# Patient Record
(demographics unavailable — no encounter records)

---

## 2024-11-07 NOTE — ASSESSMENT
[FreeTextEntry1] : 56 yo female presenting today for gelsyn injections for moderate to severe left knee OA with medial joint space narrowing. Patient had left knee csi in the past with originially great relief of pain and then noted injections became less effective. -Discussed with patient risks, benefits, alternatives of left knee intraarticular visco injection, patient tolerated well -Activities as tolerated -Rest, ice, compression, elevation, NSAIDs PRN for pain.  -All questions answered -F/u 1 week for injection #2  The diagnosis was explained in detail. The potential non-surgical and surgical treatments were reviewed. The relative risks and benefits of each option were considered relative to the patients age, activity level, medical history, symptom severity and previously attempted treatments.  The patient was advised to consult with their primary medical provider prior to initiation of any new medications to reduce the risk of adverse effects specific to their long-term home medications and medical history. The risk of gastrointestinal irritation and kidney injury specific to long-term NSAID use was discussed.   Entered by Cleve Rivera PA-C acting as scribe. Dr. Harper Attestation The documentation recorded by the scribe, in my presence, accurately reflects the service I, Dr. Harper, personally performed, and the decisions made by me with my edits as appropriate.

## 2024-11-07 NOTE — PHYSICAL EXAM
[de-identified] : Examination of the left knee is as follows: INSPECTION: mild effusion, but no ecchymosis, no erythema, no atrophy, no deformities of quad tendon or of patellar tendon PALPATION: medial joint line tenderness, but no palpable mass, no obvious defects, no increased warmth, no calf tenderness ROM: flexion 110 degrees, but extension 0 degrees STRENGTH: quadriceps 5/5, hamstring 5/5 TESTING: ligamentously stable NEURO: light touch is intact throughout GAIT: mildly antalgic, but patient ambulates without assistive device  X-rays of the left knee is as follows: [facility] Knee 3 view in the anteroposterior, lateral, skyline views: moderate tricompartmental OA with medial joint space narrowing

## 2024-11-07 NOTE — HISTORY OF PRESENT ILLNESS
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

## 2024-11-07 NOTE — PROCEDURE
[FreeTextEntry3] : Large joint injection was performed of the left knee. The indication for this procedure was pain, inflammation, x-ray evidence of Osteoarthritis on this or prior visit and repeat series performed. The site was prepped with alcohol and sterile technique used. An injection of Gelsyn, series #1 was used. Patient tolerated procedure well.  Gelsyn-3 is comprised of 3 weekly injections, each injection is comprised of 16.8 mg/2 mL. Patient was advised to call if redness, pain or fever occur and apply ice for 15 minutes out of every hour for the next 12-24 hours as tolerated.Patient has tried OTC's including aspirin, ibuprofen, Aleve, etc. or prescription NSAIDs, and/or exercises at home and/or physical therapy without satisfactory response, patient had decreased mobility in the joint and the risks, benefits, and alternatives have been discussed, and verbal consent was obtained.

## 2024-11-14 NOTE — PROCEDURE
[FreeTextEntry3] : Large joint injection was performed of the left knee. The indication for this procedure was pain, inflammation, x-ray evidence of Osteoarthritis on this or prior visit and repeat series performed. The site was prepped with alcohol and sterile technique used. An injection of Gelsyn, series #2 was used. Patient tolerated procedure well.  Gelsyn-3 is comprised of 3 weekly injections, each injection is comprised of 16.8 mg/2 mL. Patient was advised to call if redness, pain or fever occur and apply ice for 15 minutes out of every hour for the next 12-24 hours as tolerated.Patient has tried OTC's including aspirin, ibuprofen, Aleve, etc. or prescription NSAIDs, and/or exercises at home and/or physical therapy without satisfactory response, patient had decreased mobility in the joint and the risks, benefits, and alternatives have been discussed, and verbal consent was obtained.

## 2024-11-14 NOTE — ASSESSMENT
[FreeTextEntry1] : 56 yo female presenting today for gelsyn injections for moderate to severe left knee OA with medial joint space narrowing. Patient had left knee csi in the past with originially great relief of pain and then noted injections became less effective. -Discussed with patient risks, benefits, alternatives of left knee intraarticular visco injection, patient tolerated well -Activities as tolerated -Rest, ice, compression, elevation, NSAIDs PRN for pain.  -All questions answered -F/u 1 week for injection #3  The diagnosis was explained in detail. The potential non-surgical and surgical treatments were reviewed. The relative risks and benefits of each option were considered relative to the patients age, activity level, medical history, symptom severity and previously attempted treatments.  The patient was advised to consult with their primary medical provider prior to initiation of any new medications to reduce the risk of adverse effects specific to their long-term home medications and medical history. The risk of gastrointestinal irritation and kidney injury specific to long-term NSAID use was discussed.   Entered by Cleve Rivera PA-C acting as scribe. Dr. Harper Attestation The documentation recorded by the scribe, in my presence, accurately reflects the service I, Dr. Harper, personally performed, and the decisions made by me with my edits as appropriate.

## 2024-11-14 NOTE — HISTORY OF PRESENT ILLNESS
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